# Patient Record
Sex: MALE | Race: WHITE | NOT HISPANIC OR LATINO | Employment: FULL TIME | URBAN - METROPOLITAN AREA
[De-identification: names, ages, dates, MRNs, and addresses within clinical notes are randomized per-mention and may not be internally consistent; named-entity substitution may affect disease eponyms.]

---

## 2017-02-19 ENCOUNTER — ALLSCRIPTS OFFICE VISIT (OUTPATIENT)
Dept: OTHER | Facility: OTHER | Age: 22
End: 2017-02-19

## 2018-01-11 NOTE — PROGRESS NOTES
Assessment    1  Family history of malignant neoplasm (V16 9) (Z80 9) : Grandparent   2  Family history of Secondary hypertension, unspecified : Mother   3  Skin rash (782 1) (R21)   4  Keratosis pilaris (757 39) (Q82 9)    Plan  Keratosis pilaris    · Tretinoin 0 025 % External Cream; APPLY SPARINGLY TO AFFECTED AREA(S)  ONCE DAILY AT BEDTIME  Skin rash    · MethylPREDNISolone (Osiel) 4 MG Oral Tablet; 6 tab PO daily 1, then 5 tab Po day  2, then 4 tab PO day 3, then 3 tab Po day 4, then 2 tab PO day 5, then  1 tab PO day 6 to be taken at 8am    Discussion/Summary    1  Keratosis Pilaris: I will start the patient on Tretinoin 0 025% external cream and Medrol dose osiel  I also recommended that the patient use a moisturizing cream to be applied to the arms and legs  2  Return to office if not better  The patient was counseled regarding instructions for management, prognosis, patient and family education, impressions  total time of encounter was 15 minutes and 6 minutes was spent counseling  The treatment plan was reviewed with the patient/guardian  The patient/guardian understands and agrees with the treatment plan      Chief Complaint  Rash on both legs and arms  Also on upper chest and lower left abdomen  History of Present Illness  HPI: The patient is a 24year old male who presents to the office because of a rash noted on both arms and legs under the hair follicles  The patient's rash are characterized as red bumps  The rash rarely itches  The patient denies any change in bodywashes or deodorants  The patient denies any change in detergents  Rash: Naheed Willams presents with complaints of rash     Associated symptoms include skin bumps, skin dryness, skin redness and skin scaling, but no skin blistering, no cracking, no crusting, no draining, no skin oiliness, no pain, no pruritus, no skin ulceration, no skin weeping, no excoriations, no fever, no fissuring, no nausea, no pustules, no purulent drainage and no serous discharge  Hospital Based Practices Required Assessment:   Pain Assessment   the patient states they do not have pain  The pain radiates to the None  Prefered Language is  [place]  Primary Language is  English  Review of Systems    Constitutional: no fever and no chills  Integumentary: a rash, itching and skin lesion  ROS reviewed  Past Medical History  Active Problems And Past Medical History Reviewed: The active problems and past medical history were reviewed and updated today  Family History  Family History Reviewed: The family history was reviewed and updated today  Social History  The social history was reviewed and is unchanged  Surgical History  Surgical History Reviewed: The surgical history was reviewed and updated today  Current Meds    The medication list was reviewed and updated today  Vitals   Recorded: 20Jan2016 04:12PM   Temperature 98 F   Heart Rate 60   Respiration 16   Systolic 536   Diastolic 70   Height 5 ft 8 5 in   2-20 Stature Percentile 36 %   Weight 147 lb    2-20 Weight Percentile 39 %   BMI Calculated 22 03   BMI Percentile 42 %   BSA Calculated 1 8     Physical Exam    Constitutional   General appearance: No acute distress, well appearing and well nourished  Skin   Examination of the skin for lesions: Abnormal   Red bumps noted under the hair follicles on arms and legs     Psychiatric   Orientation to person, place and time: Normal     Mood and affect: Normal          Signatures   Electronically signed by : Alicia Silva DO; Jan 20 2016  5:19PM EST                       (Author)

## 2018-01-14 VITALS
BODY MASS INDEX: 21.47 KG/M2 | TEMPERATURE: 98.3 F | SYSTOLIC BLOOD PRESSURE: 118 MMHG | DIASTOLIC BLOOD PRESSURE: 80 MMHG | WEIGHT: 150 LBS | RESPIRATION RATE: 16 BRPM | HEART RATE: 66 BPM | OXYGEN SATURATION: 99 % | HEIGHT: 70 IN

## 2019-01-22 ENCOUNTER — OFFICE VISIT (OUTPATIENT)
Dept: FAMILY MEDICINE CLINIC | Facility: CLINIC | Age: 24
End: 2019-01-22
Payer: COMMERCIAL

## 2019-01-22 VITALS
BODY MASS INDEX: 23.55 KG/M2 | WEIGHT: 159 LBS | RESPIRATION RATE: 14 BRPM | HEART RATE: 84 BPM | TEMPERATURE: 97.8 F | DIASTOLIC BLOOD PRESSURE: 70 MMHG | SYSTOLIC BLOOD PRESSURE: 110 MMHG | HEIGHT: 69 IN

## 2019-01-22 DIAGNOSIS — J00 COMMON COLD: Primary | ICD-10-CM

## 2019-01-22 PROCEDURE — 99213 OFFICE O/P EST LOW 20 MIN: CPT | Performed by: NURSE PRACTITIONER

## 2019-01-22 NOTE — PATIENT INSTRUCTIONS
Cold Symptoms   AMBULATORY CARE:   Cold symptoms  include sneezing, dry throat, a stuffy nose, headache, watery eyes, and a cough  Your cough may be dry, or you may cough up mucus  You may also have muscle aches, joint pain, and tiredness  Rarely, you may have a fever  Cold symptoms occur from inflammation in your upper respiratory system caused by a virus  Most colds go away without treatment  Seek care immediately if:   · You have increased tiredness and weakness  · You are unable to eat  · Your heart is beating much faster than usual for you  · You see white spots in the back of your throat and your neck is swollen and sore to the touch  · You see pinpoint or larger reddish-purple dots on your skin  Contact your healthcare provider if:   · You have a fever higher than 102°F (38 9°C)  · You have new or worsening shortness of breath  · You have thick nasal drainage for more than 2 days  · Your symptoms do not improve or get worse within 5 days  · You have questions or concerns about your condition or care  Treatment for cold symptoms  may include NSAIDS to decrease muscle aches and fever  Cold medicines may also be given to decrease coughing, nasal stuffiness, sneezing, and a runny nose  Manage your cold symptoms: The following may help relieve cold symptoms, such as a dry throat and congestion:  · Gargle with mouthwash or warm salt water as directed  · Suck on throat lozenges or hard candy  · Use a cold or warm vaporizer or humidifier to ease your breathing  · Rest for at least 2 days and then as needed to decrease tiredness and weakness  · Use petroleum based jelly around your nostrils to decrease irritation from blowing your nose  · Drink plenty of liquids  Liquids will help thin and loosen thick mucus so you can cough it up  Liquids will also keep you hydrated   Ask your healthcare provider which liquids are best for you and how much to drink each day   Prevent the spread of germs  by washing your hands often  You can spread your cold germs to others for at least 3 days after your symptoms start  Do not share items, such as eating utensils  Cover your nose and mouth when you cough or sneeze using the crook of your elbow instead of your hands  Throw used tissues in the garbage  Do not smoke:  Smoking may worsen your symptoms and increase the length of time you feel sick  Talk with your healthcare provider if you need help to stop smoking  Follow up with your healthcare provider as directed:  Write down your questions so you remember to ask them during your visits  © 2017 [facility] Information is for End User's use only and may not be sold, redistributed or otherwise used for commercial purposes  All illustrations and images included in CareNotes® are the copyrighted property of [employer]  or Nasim Constantino  The above information is an  only  It is not intended as medical advice for individual conditions or treatments  Talk to your doctor, nurse or pharmacist before following any medical regimen to see if it is safe and effective for you

## 2019-01-22 NOTE — PROGRESS NOTES
Assessment:      viral upper respiratory illness    no evidence of bacterial infection on exam  Plan:      Discussed diagnosis and treatment of URI  Discussed the importance of avoiding unnecessary antibiotic therapy  Suggested symptomatic OTC remedies  Nasal saline spray for congestion  Nasal steroids per orders  Follow up as needed  Call in 7 days if symptoms aren't resolving  Subjective:       History was provided by the patient  Main Loya is a 26 y o  male who presents for evaluation of symptoms of a URI  Symptoms include dry cough, post nasal drip, sinus and nasal congestion and sore throat  Onset of symptoms was 2 days ago, unchanged since that time  Denies achiness, lightheadedness, low grade fever, productive cough with  yellow colored sputum, purulent nasal discharge, rash, shortness of breath, vertigo and wheezing  He is not drinking much  Evaluation to date: none  Treatment to date: none  +sick contacts=child and GF have similar  The following portions of the patient's history were reviewed and updated as appropriate: allergies, current medications, past family history, past medical history, past social history, past surgical history and problem list     Review of Systems  Pertinent items are noted in HPI        Objective:      /70 (BP Location: Left arm, Patient Position: Sitting, Cuff Size: Standard)   Pulse 84   Temp 97 8 °F (36 6 °C) (Temporal)   Resp 14   Ht 5' 9" (1 753 m)   Wt 72 1 kg (159 lb)   BMI 23 48 kg/m²   General appearance: alert and oriented, in no acute distress  Head: Normocephalic, without obvious abnormality, sinuses nontender to percussion  Ears: normal TM's and external ear canals both ears  Nose: no discharge, turbinates red  Throat: lips, mucosa, and tongue normal; teeth and gums normal  Lungs: clear to auscultation bilaterally  Heart: regular rate and rhythm, S1, S2 normal, no murmur, click, rub or gallop  Lymph nodes: Cervical, supraclavicular, and axillary nodes normal

## 2019-01-22 NOTE — LETTER
January 22, 2019     Patient: Jose Raymundo   YOB: 1995   Date of Visit: 1/22/2019       To Whom it May Concern:    Melita Aguilar is under my professional care  He was seen in my office on 1/22/2019  He may return to work on 1/23/19  If you have any questions or concerns, please don't hesitate to call           Sincerely,          JOVANA Torrez        CC: No Recipients

## 2023-08-08 ENCOUNTER — OFFICE VISIT (OUTPATIENT)
Dept: URGENT CARE | Facility: CLINIC | Age: 28
End: 2023-08-08
Payer: COMMERCIAL

## 2023-08-08 VITALS
DIASTOLIC BLOOD PRESSURE: 68 MMHG | SYSTOLIC BLOOD PRESSURE: 112 MMHG | OXYGEN SATURATION: 98 % | HEART RATE: 81 BPM | WEIGHT: 176 LBS | RESPIRATION RATE: 16 BRPM | BODY MASS INDEX: 26.07 KG/M2 | TEMPERATURE: 98.7 F | HEIGHT: 69 IN

## 2023-08-08 DIAGNOSIS — J06.9 ACUTE URI: Primary | ICD-10-CM

## 2023-08-08 LAB — S PYO AG THROAT QL: NEGATIVE

## 2023-08-08 PROCEDURE — 87880 STREP A ASSAY W/OPTIC: CPT | Performed by: FAMILY MEDICINE

## 2023-08-08 PROCEDURE — 99203 OFFICE O/P NEW LOW 30 MIN: CPT | Performed by: FAMILY MEDICINE

## 2023-08-08 NOTE — PATIENT INSTRUCTIONS
Acute viral URI   - rest and drink plenty of fluids  - take Tylenol or Motrin as needed   - advised warm salt water gargles and throat lozenges as needed   - drink warm tea w/ lemon and honey   - run a humidifier at home   - advised using Flonase nasal spray   - may take Mucinex as needed for cough  - if symptoms persist despite treatment, worsen, or any new symptoms present, should be seen by PCP for re-check

## 2023-08-08 NOTE — PROGRESS NOTES
[facility]        NAME: Aubrie Joe is a 33 y.o. male  : 1995    MRN: [id number]  DATE: 2023  TIME: 4:51 PM    Assessment and Plan   Acute URI [J06.9]  1. Acute URI  POCT rapid strepA            Patient Instructions     Patient Instructions   Acute viral URI   - rest and drink plenty of fluids  - take Tylenol or Motrin as needed   - advised warm salt water gargles and throat lozenges as needed   - drink warm tea w/ lemon and honey   - run a humidifier at home   - advised using Flonase nasal spray   - may take Mucinex as needed for cough  - if symptoms persist despite treatment, worsen, or any new symptoms present, should be seen by PCP for re-check       Follow up with PCP in 3-5 days. Proceed to  ER if symptoms worsen. Chief Complaint     Chief Complaint   Patient presents with   • Cold Like Symptoms     X 2 days, took Advil Cold and Sinus. History of Present Illness       34 yo male presents c/o sinus pressure, nasal congestion, rhinorrhea, post-nasal drip, sore throat, and cough. He has been ill x 2 days. No fever/chills. No headache or body aches. No chest pain, SOB, or wheezing. Non-smoker. No GI sx. No skin rashes. No loss of taste or smell. No recent travel or known exposure to anyone with COVID-19. Patient has received the COVID-19 vaccine. He has no known allergies. He has used Advil Cold and Sinus for the symptoms. Review of Systems   Review of Systems   Constitutional: Negative. HENT:        As noted in HPI   Eyes: Negative. Respiratory:        As noted in HPI   Cardiovascular: Negative. Gastrointestinal: Negative. Musculoskeletal: Negative. Skin: Negative. Allergic/Immunologic: Negative. Neurological: Negative. Hematological: Negative. Current Medications     No current outpatient medications on file.     Current Allergies     Allergies as of 2023   • (No Known Allergies)            The following portions of the patient's history were reviewed and updated as appropriate: allergies, current medications, past family history, past medical history, past social history, past surgical history and problem list.     History reviewed. No pertinent past medical history. History reviewed. No pertinent surgical history. Family History   Problem Relation Age of Onset   • Heart disease Mother    • Arthritis Mother    • Arthritis Father          Medications have been verified. Objective   /68   Pulse 81   Temp 98.7 °F (37.1 °C)   Resp 16   Ht 5' 9" (1.753 m)   Wt 79.8 kg (176 lb)   SpO2 98%   BMI 25.99 kg/m²   No LMP for male patient. Physical Exam     Physical Exam  Vitals and nursing note reviewed. Constitutional:       General: He is awake. He is not in acute distress. Appearance: Normal appearance. He is well-developed and well-groomed. He is not ill-appearing, toxic-appearing or diaphoretic. HENT:      Head: Normocephalic and atraumatic. Jaw: There is normal jaw occlusion. Right Ear: Tympanic membrane, ear canal and external ear normal.      Left Ear: Tympanic membrane, ear canal and external ear normal.      Nose: Mucosal edema present. Mouth/Throat:      Lips: Pink. No lesions. Mouth: Mucous membranes are moist.      Pharynx: Uvula midline. Posterior oropharyngeal erythema present. No pharyngeal swelling, oropharyngeal exudate or uvula swelling. Tonsils: No tonsillar exudate or tonsillar abscesses. Eyes:      General: Lids are normal.      Conjunctiva/sclera: Conjunctivae normal.   Neck:      Trachea: Trachea and phonation normal.   Cardiovascular:      Rate and Rhythm: Normal rate and regular rhythm. Pulses: Normal pulses. Heart sounds: Normal heart sounds. Pulmonary:      Effort: Pulmonary effort is normal. No tachypnea, accessory muscle usage or respiratory distress. Breath sounds: Normal breath sounds and air entry.    Musculoskeletal: Cervical back: Neck supple. No edema, erythema, rigidity or tenderness. Lymphadenopathy:      Cervical: No cervical adenopathy. Skin:     General: Skin is warm and dry. Capillary Refill: Capillary refill takes less than 2 seconds. Coloration: Skin is not pale. Neurological:      Mental Status: He is alert and oriented to person, place, and time. Mental status is at baseline. Psychiatric:         Mood and Affect: Mood normal.         Behavior: Behavior normal. Behavior is cooperative. Thought Content:  Thought content normal.         Judgment: Judgment normal.